# Patient Record
Sex: FEMALE | Race: WHITE | NOT HISPANIC OR LATINO
[De-identification: names, ages, dates, MRNs, and addresses within clinical notes are randomized per-mention and may not be internally consistent; named-entity substitution may affect disease eponyms.]

---

## 2020-01-27 PROBLEM — Z00.00 ENCOUNTER FOR PREVENTIVE HEALTH EXAMINATION: Status: ACTIVE | Noted: 2020-01-27

## 2020-01-28 ENCOUNTER — TRANSCRIPTION ENCOUNTER (OUTPATIENT)
Age: 50
End: 2020-01-28

## 2020-01-28 ENCOUNTER — APPOINTMENT (OUTPATIENT)
Dept: ORTHOPEDIC SURGERY | Facility: CLINIC | Age: 50
End: 2020-01-28

## 2020-01-28 ENCOUNTER — APPOINTMENT (OUTPATIENT)
Dept: ORTHOPEDIC SURGERY | Facility: CLINIC | Age: 50
End: 2020-01-28
Payer: MEDICAID

## 2020-01-28 VITALS
HEIGHT: 66 IN | OXYGEN SATURATION: 96 % | SYSTOLIC BLOOD PRESSURE: 124 MMHG | DIASTOLIC BLOOD PRESSURE: 70 MMHG | BODY MASS INDEX: 20.89 KG/M2 | WEIGHT: 130 LBS | HEART RATE: 74 BPM

## 2020-01-28 DIAGNOSIS — Z78.9 OTHER SPECIFIED HEALTH STATUS: ICD-10-CM

## 2020-01-28 DIAGNOSIS — S92.332A DISPLACED FRACTURE OF THIRD METATARSAL BONE, LEFT FOOT, INITIAL ENCOUNTER FOR CLOSED FRACTURE: ICD-10-CM

## 2020-01-28 DIAGNOSIS — Z86.69 PERSONAL HISTORY OF OTHER DISEASES OF THE NERVOUS SYSTEM AND SENSE ORGANS: ICD-10-CM

## 2020-01-28 DIAGNOSIS — Z60.2 PROBLEMS RELATED TO LIVING ALONE: ICD-10-CM

## 2020-01-28 PROCEDURE — 99204 OFFICE O/P NEW MOD 45 MIN: CPT

## 2020-01-28 SDOH — SOCIAL STABILITY - SOCIAL INSECURITY: PROBLEMS RELATED TO LIVING ALONE: Z60.2

## 2020-01-29 PROBLEM — Z86.69 HISTORY OF MULTIPLE SCLEROSIS: Status: RESOLVED | Noted: 2020-01-28 | Resolved: 2020-01-29

## 2020-01-29 PROBLEM — Z60.2 PERSON LIVING ALONE: Status: ACTIVE | Noted: 2020-01-28

## 2020-01-29 PROBLEM — Z78.9 EXERCISES OCCASIONALLY: Status: ACTIVE | Noted: 2020-01-28

## 2020-01-29 PROBLEM — S92.332A CLOSED DISPLACED FRACTURE OF THIRD METATARSAL BONE OF LEFT FOOT, INITIAL ENCOUNTER: Status: ACTIVE | Noted: 2020-01-29

## 2020-01-29 PROBLEM — Z78.9 CONSUMES ALCOHOL OCCASIONALLY: Status: ACTIVE | Noted: 2020-01-28

## 2020-01-29 RX ORDER — AMLODIPINE BESYLATE 5 MG/1
TABLET ORAL
Refills: 0 | Status: ACTIVE | COMMUNITY

## 2020-01-29 RX ORDER — DEXTROAMPHETAMINE SULFATE, DEXTROAMPHETAMINE SACCHARATE, AMPHETAMINE SULFATE AND AMPHETAMINE ASPARTATE 3.75; 3.75; 3.75; 3.75 MG/1; MG/1; MG/1; MG/1
15 CAPSULE, EXTENDED RELEASE ORAL
Refills: 0 | Status: ACTIVE | COMMUNITY

## 2020-01-29 RX ORDER — MONTELUKAST SODIUM 10 MG/1
TABLET, FILM COATED ORAL
Refills: 0 | Status: ACTIVE | COMMUNITY

## 2020-01-29 RX ORDER — OCRELIZUMAB 300 MG/10ML
INJECTION INTRAVENOUS
Refills: 0 | Status: ACTIVE | COMMUNITY

## 2020-01-29 RX ORDER — LAMOTRIGINE 2 MG/1
TABLET, FOR SUSPENSION ORAL
Refills: 0 | Status: ACTIVE | COMMUNITY

## 2020-01-29 RX ORDER — CITALOPRAM HYDROBROMIDE 10 MG/1
TABLET, FILM COATED ORAL
Refills: 0 | Status: ACTIVE | COMMUNITY

## 2020-01-29 RX ORDER — LEVOTHYROXINE SODIUM 137 UG/1
TABLET ORAL
Refills: 0 | Status: ACTIVE | COMMUNITY

## 2020-01-29 NOTE — PHYSICAL EXAM
[de-identified] : General appearance: well nourished and hydrated, pleasant, alert and oriented x 3, cooperative.  \par HEENT: normocephalic, EOM intact, nasal septum midline, oral cavity clear, external auditory canal clear.  \par Cardiovascular: no lower leg edema, no varicosities, dorsalis pedis pulses palpable and symmetric.  \par Lymphatics: no palpable lymphadenopathy, no lymphedema.  \par Neurologic: sensation is normal, no muscle weakness in left upper or lower extremities, generalized weakness 4+/5 in right upper and lower extremities.\par Dermatologic: skin moist, warm, no rash.  \par Spine: cervical spine with normal lordosis and painless range of motion, thoracic spine with normal kyphosis and painless range of motion, lumbosacral spine with normal lordosis and painless range of motion.\par Gait: ambulating with bilateral crutches.\par \par Left foot\par - Inspection: positive plantar and dorsal midfoot/forefoot swelling and ecchymosis; no erythema.  No rotational deformity of the third toe.\par - Wounds: none.  \par - ROM: all toes go up/down\par - Palpation: tenderness to palpation through central forefoot. Toes, hindfoot, heel nontender.  \par - Strength: deferred. [de-identified] : Outside imaging dated 1/25/20 was reviewed. There is a mildly displaced transverse fracture of the left distal third metatarsal shaft. No other osseous injuries. No significant bony shortening.

## 2020-01-29 NOTE — DISCUSSION/SUMMARY
[de-identified] : 50y/o female 4 days s/p left third metatarsal fracture\par - Discussed the diagnosis and treatment options with her. The injury is stable and I counseled her that it will heal without intervention.\par - WBAT LLE in CAM\par - Gradual weaning of crutches (in consideration of right hemiparesis)\par - RTC 4wk, with new XRs left foot

## 2020-01-29 NOTE — HISTORY OF PRESENT ILLNESS
[de-identified] : 48y/o female accompanied by her sister for evaluation of a closed left third distal metatarsal shaft fracture that occurred on 1/24/20. Mechanism of injury was that she dropped a heavy weight directly onto the foot. She went to [facility] the following day where the injury was diagnosed via XR. She has been NWB on crutches since that time. No prior fractures or left foot injuries in the past. No other injuries this event.\par \par She has a history of MS with right sided weakness. She uses a right ankle brace at baseline.

## 2020-02-24 ENCOUNTER — FORM ENCOUNTER (OUTPATIENT)
Age: 50
End: 2020-02-24

## 2020-02-25 ENCOUNTER — OUTPATIENT (OUTPATIENT)
Dept: OUTPATIENT SERVICES | Facility: HOSPITAL | Age: 50
LOS: 1 days | End: 2020-02-25
Payer: COMMERCIAL

## 2020-02-25 ENCOUNTER — APPOINTMENT (OUTPATIENT)
Dept: ORTHOPEDIC SURGERY | Facility: CLINIC | Age: 50
End: 2020-02-25
Payer: MEDICAID

## 2020-02-25 DIAGNOSIS — S92.332D DISPLACED FRACTURE OF THIRD METATARSAL BONE, LEFT FOOT, SUBSEQUENT ENCOUNTER FOR FRACTURE WITH ROUTINE HEALING: ICD-10-CM

## 2020-02-25 PROCEDURE — 99213 OFFICE O/P EST LOW 20 MIN: CPT

## 2020-02-25 PROCEDURE — 73630 X-RAY EXAM OF FOOT: CPT

## 2020-02-25 PROCEDURE — 73630 X-RAY EXAM OF FOOT: CPT | Mod: 26,LT

## 2020-02-25 NOTE — HISTORY OF PRESENT ILLNESS
[de-identified] : 48y/o female following up for left 3rd MTFx 1/24/20. She only wore the CAM for a couple of days after the last time we met and has generally been wearing sneakers. Pain has improved though she still gets twinges of pain with long periods of standing and ambulation. Swelling has essentially resolved.

## 2020-02-25 NOTE — DISCUSSION/SUMMARY
[de-identified] : 48y/o female about 1mo s/p left third metatarsal shaft fracture\par - Cont FWB in regular shoewear\par - Encouraged ongoing low impact exercise, ambulation as tolerated\par - Cont ice/elevation/NSAIDs/Tylenol as needed\par - RTC 6wk with new left foot XRs, likely d/c from service

## 2020-02-25 NOTE — PHYSICAL EXAM
[de-identified] : General appearance: well nourished and hydrated, pleasant, alert and oriented x 3, cooperative. \par HEENT: normocephalic, EOM intact, nasal septum midline, oral cavity clear, external auditory canal clear. \par Cardiovascular: no lower leg edema, no varicosities, dorsalis pedis pulses palpable and symmetric. \par Lymphatics: no palpable lymphadenopathy, no lymphedema. \par Neurologic: sensation is normal, no muscle weakness in left upper or lower extremities, generalized weakness 4+/5 in right upper and lower extremities.\par Dermatologic: skin moist, warm, no rash. \par Spine: cervical spine with normal lordosis and painless range of motion, thoracic spine with normal kyphosis and painless range of motion, lumbosacral spine with normal lordosis and painless range of motion.\par Gait: mild left antalgia.\par \par Left foot\par - Inspection: negative swelling, ecchymosis, and erythema. No rotational deformity of the third toe.\par - Wounds: none. \par - ROM: all toes go up/down\par - Palpation: no specific tenderness to palpation. Nodule of firm callus noted at third metatarsal shaft. \par - Strength: deferred.  [de-identified] : Left foot imaging taken today demonstrates stable position of the third metatarsal shaft fracture with abundant bridging callus.